# Patient Record
Sex: FEMALE | Race: WHITE | Employment: OTHER | URBAN - METROPOLITAN AREA
[De-identification: names, ages, dates, MRNs, and addresses within clinical notes are randomized per-mention and may not be internally consistent; named-entity substitution may affect disease eponyms.]

---

## 2018-01-03 ENCOUNTER — IMPORTED ENCOUNTER (OUTPATIENT)
Dept: URBAN - METROPOLITAN AREA CLINIC 50 | Facility: CLINIC | Age: 68
End: 2018-01-03

## 2018-01-08 ENCOUNTER — IMPORTED ENCOUNTER (OUTPATIENT)
Dept: URBAN - METROPOLITAN AREA CLINIC 50 | Facility: CLINIC | Age: 68
End: 2018-01-08

## 2018-01-17 ENCOUNTER — IMPORTED ENCOUNTER (OUTPATIENT)
Dept: URBAN - METROPOLITAN AREA CLINIC 50 | Facility: CLINIC | Age: 68
End: 2018-01-17

## 2019-01-08 ENCOUNTER — IMPORTED ENCOUNTER (OUTPATIENT)
Dept: URBAN - METROPOLITAN AREA CLINIC 50 | Facility: CLINIC | Age: 69
End: 2019-01-08

## 2020-01-06 ENCOUNTER — IMPORTED ENCOUNTER (OUTPATIENT)
Dept: URBAN - METROPOLITAN AREA CLINIC 50 | Facility: CLINIC | Age: 70
End: 2020-01-06

## 2020-01-07 ENCOUNTER — IMPORTED ENCOUNTER (OUTPATIENT)
Dept: URBAN - METROPOLITAN AREA CLINIC 50 | Facility: CLINIC | Age: 70
End: 2020-01-07

## 2021-01-12 ENCOUNTER — IMPORTED ENCOUNTER (OUTPATIENT)
Dept: URBAN - METROPOLITAN AREA CLINIC 50 | Facility: CLINIC | Age: 71
End: 2021-01-12

## 2021-01-12 NOTE — PATIENT DISCUSSION
"""Continue Artificial tears both eyes as needed . "" ""Continue Erythromycin Ointment both eyes as needed . "" ""Continue Lid Scrubs both eyes as needed . """

## 2021-01-25 ENCOUNTER — IMPORTED ENCOUNTER (OUTPATIENT)
Dept: URBAN - METROPOLITAN AREA CLINIC 50 | Facility: CLINIC | Age: 71
End: 2021-01-25

## 2021-05-15 ASSESSMENT — TONOMETRY
OD_IOP_MMHG: 16
OS_IOP_MMHG: 16
OD_IOP_MMHG: 16
OD_IOP_MMHG: 14
OS_IOP_MMHG: 16
OS_IOP_MMHG: 15
OD_IOP_MMHG: 15
OS_IOP_MMHG: 15

## 2021-05-15 ASSESSMENT — VISUAL ACUITY
OD_CC: J1+@ 18 IN
OS_SC: 20/30-1+2
OS_SC: 20/30-
OS_CC: J1+@ 18 IN
OD_CC: J1@ 13 IN
OD_SC: 20/30
OS_CC: J1+@ 16 IN
OD_CC: J1+@ 16 IN
OS_SC: 20/30
OD_SC: 20/30-1+2
OS_SC: 20/25-2
OD_SC: 20/30-1
OS_CC: J1+
OS_CC: J1@ 13 IN
OS_PH: 20/25-2
OD_CC: J1+
OD_SC: 20/25

## 2022-01-04 ENCOUNTER — PREPPED CHART (OUTPATIENT)
Dept: URBAN - METROPOLITAN AREA CLINIC 53 | Facility: CLINIC | Age: 72
End: 2022-01-04

## 2022-03-01 ENCOUNTER — COMPREHENSIVE EXAM (OUTPATIENT)
Dept: URBAN - METROPOLITAN AREA CLINIC 49 | Facility: CLINIC | Age: 72
End: 2022-03-01

## 2022-03-01 DIAGNOSIS — H43.813: ICD-10-CM

## 2022-03-01 DIAGNOSIS — H02.834: ICD-10-CM

## 2022-03-01 DIAGNOSIS — H02.831: ICD-10-CM

## 2022-03-01 PROCEDURE — 92014 COMPRE OPH EXAM EST PT 1/>: CPT

## 2022-03-01 ASSESSMENT — TONOMETRY
OS_IOP_MMHG: 15
OD_IOP_MMHG: 16

## 2022-03-01 ASSESSMENT — VISUAL ACUITY
OD_SC: 20/25+/-
OU_CC: J1+
OS_SC: 20/20-2

## 2022-03-01 NOTE — PATIENT DISCUSSION
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 Patient would like to try again and be more relaxed as she has a natural tendency to raise brows to see.

## 2023-05-04 ENCOUNTER — DIAGNOSTICS ONLY (OUTPATIENT)
Dept: URBAN - METROPOLITAN AREA CLINIC 53 | Facility: CLINIC | Age: 73
End: 2023-05-04

## 2023-05-04 DIAGNOSIS — H02.834: ICD-10-CM

## 2023-05-04 DIAGNOSIS — H02.831: ICD-10-CM

## 2023-05-04 PROCEDURE — 92082 INTERMEDIATE VISUAL FIELD XM: CPT

## 2023-05-04 PROCEDURE — 92285 EXTERNAL OCULAR PHOTOGRAPHY: CPT

## 2023-05-25 ENCOUNTER — CONSULTATION/EVALUATION (OUTPATIENT)
Dept: URBAN - METROPOLITAN AREA CLINIC 53 | Facility: CLINIC | Age: 73
End: 2023-05-25

## 2023-05-25 DIAGNOSIS — H02.834: ICD-10-CM

## 2023-05-25 DIAGNOSIS — H02.831: ICD-10-CM

## 2023-05-25 PROCEDURE — 92012 INTRM OPH EXAM EST PATIENT: CPT

## 2023-05-25 PROCEDURE — 92285 EXTERNAL OCULAR PHOTOGRAPHY: CPT

## 2023-05-25 ASSESSMENT — VISUAL ACUITY
OD_SC: 20/25-2
OS_SC: 20/25

## 2023-05-25 ASSESSMENT — TONOMETRY
OD_IOP_MMHG: 16
OS_IOP_MMHG: 16

## 2023-09-14 ENCOUNTER — PRE-OP/H&P (OUTPATIENT)
Dept: URBAN - METROPOLITAN AREA CLINIC 53 | Facility: CLINIC | Age: 73
End: 2023-09-14

## 2023-09-14 DIAGNOSIS — H02.834: ICD-10-CM

## 2023-09-14 DIAGNOSIS — H02.831: ICD-10-CM

## 2023-09-14 PROCEDURE — PREOP PRE OP VISIT

## 2023-09-14 ASSESSMENT — VISUAL ACUITY
OD_SC: 20/30-1
OS_SC: 20/30
OD_PH: 20/25-1
OS_PH: 20/25

## 2023-09-19 ENCOUNTER — SURGERY/PROCEDURE (OUTPATIENT)
Dept: URBAN - METROPOLITAN AREA SURGERY 16 | Facility: SURGERY | Age: 73
End: 2023-09-19

## 2023-09-19 DIAGNOSIS — H02.834: ICD-10-CM

## 2023-09-19 DIAGNOSIS — H02.831: ICD-10-CM

## 2023-09-19 PROCEDURE — 15823 50 BLEPHAROPLASTY, UPPER WITH EXTENSIVE SKIN WEIGHING DOWN LID (BILATERAL)

## 2023-09-28 ENCOUNTER — POST-OP (OUTPATIENT)
Dept: URBAN - METROPOLITAN AREA CLINIC 53 | Facility: CLINIC | Age: 73
End: 2023-09-28

## 2023-09-28 DIAGNOSIS — Z98.890: ICD-10-CM

## 2023-09-28 PROCEDURE — 99024 POSTOP FOLLOW-UP VISIT: CPT

## 2023-09-28 ASSESSMENT — VISUAL ACUITY
OS_SC: 20/25-1
OD_SC: 20/25-1

## 2023-10-19 ENCOUNTER — POST-OP (OUTPATIENT)
Dept: URBAN - METROPOLITAN AREA CLINIC 53 | Facility: CLINIC | Age: 73
End: 2023-10-19

## 2023-10-19 DIAGNOSIS — Z98.890: ICD-10-CM

## 2023-10-19 PROCEDURE — 99024 POSTOP FOLLOW-UP VISIT: CPT

## 2023-10-19 PROCEDURE — 92285 EXTERNAL OCULAR PHOTOGRAPHY: CPT

## 2023-10-19 ASSESSMENT — VISUAL ACUITY
OS_SC: 20/25-2
OD_SC: 20/25-2
OU_SC: 20/25+2

## 2024-02-13 ENCOUNTER — COMPREHENSIVE EXAM (OUTPATIENT)
Dept: URBAN - METROPOLITAN AREA CLINIC 53 | Facility: CLINIC | Age: 74
End: 2024-02-13

## 2024-02-13 DIAGNOSIS — H43.813: ICD-10-CM

## 2024-02-13 DIAGNOSIS — H04.123: ICD-10-CM

## 2024-02-13 DIAGNOSIS — H35.033: ICD-10-CM

## 2024-02-13 PROCEDURE — 99214 OFFICE O/P EST MOD 30 MIN: CPT

## 2024-02-13 PROCEDURE — 92015 DETERMINE REFRACTIVE STATE: CPT

## 2024-02-13 ASSESSMENT — VISUAL ACUITY
OD_SC: 20/30
OS_PH: 20/25
OS_SC: 20/30
OD_PH: 20/25
OU_CC: J1+

## 2024-02-13 ASSESSMENT — TONOMETRY
OS_IOP_MMHG: 15
OD_IOP_MMHG: 16

## 2025-02-11 ENCOUNTER — COMPREHENSIVE EXAM (OUTPATIENT)
Age: 75
End: 2025-02-11

## 2025-02-11 DIAGNOSIS — H43.813: ICD-10-CM

## 2025-02-11 DIAGNOSIS — H35.033: ICD-10-CM

## 2025-02-11 DIAGNOSIS — H04.123: ICD-10-CM

## 2025-02-11 PROCEDURE — 99214 OFFICE O/P EST MOD 30 MIN: CPT
